# Patient Record
Sex: FEMALE | Race: WHITE | NOT HISPANIC OR LATINO
[De-identification: names, ages, dates, MRNs, and addresses within clinical notes are randomized per-mention and may not be internally consistent; named-entity substitution may affect disease eponyms.]

---

## 2019-02-20 PROBLEM — Z00.00 ENCOUNTER FOR PREVENTIVE HEALTH EXAMINATION: Status: ACTIVE | Noted: 2019-02-20

## 2019-03-08 ENCOUNTER — APPOINTMENT (OUTPATIENT)
Dept: SURGERY | Facility: CLINIC | Age: 29
End: 2019-03-08
Payer: MEDICAID

## 2019-03-08 VITALS
SYSTOLIC BLOOD PRESSURE: 120 MMHG | RESPIRATION RATE: 18 BRPM | OXYGEN SATURATION: 100 % | WEIGHT: 262 LBS | BODY MASS INDEX: 39.71 KG/M2 | TEMPERATURE: 97.8 F | HEIGHT: 68 IN | HEART RATE: 68 BPM | DIASTOLIC BLOOD PRESSURE: 78 MMHG

## 2019-03-08 DIAGNOSIS — Z78.9 OTHER SPECIFIED HEALTH STATUS: ICD-10-CM

## 2019-03-08 PROCEDURE — 99204 OFFICE O/P NEW MOD 45 MIN: CPT

## 2019-03-10 PROBLEM — Z78.9 NON-SMOKER: Status: ACTIVE | Noted: 2019-03-08

## 2019-03-10 RX ORDER — CETIRIZINE HCL 10 MG
10 TABLET ORAL
Refills: 0 | Status: ACTIVE | COMMUNITY

## 2019-03-10 NOTE — REASON FOR VISIT
[Initial Consultation] : an initial consultation for [FreeTextEntry2] : thyroid nodule [Parent] : parent

## 2019-03-10 NOTE — PHYSICAL EXAM
[de-identified] : 4 cm left thyroid nodule, well circumscribed and mobile [Laryngoscopy Performed] : laryngoscopy was performed, see procedure section for findings [Midline] : located in midline position [Normal] : orientation to person, place, and time: normal [de-identified] :  laryngoscopy shows normal vocal cord mobility bilaterally with no lesions noted

## 2019-03-10 NOTE — CONSULT LETTER
[Dear  ___] : Dear  [unfilled], [Consult Letter:] : I had the pleasure of evaluating your patient, [unfilled]. [Please see my note below.] : Please see my note below. [Consult Closing:] : Thank you very much for allowing me to participate in the care of this patient.  If you have any questions, please do not hesitate to contact me. [Sincerely,] : Sincerely, [FreeTextEntry2] : Dr. Sammi Zepeda, Dr. Shmuel Schultz [FreeTextEntry3] : Riley Lantigua MD, FACS\par System Director, Endocrine Surgery\par [facility]\par  [DrTeri  ___] : Dr. FRANCIS

## 2019-03-10 NOTE — HISTORY OF PRESENT ILLNESS
[de-identified] : thyroid nodule noted by patient for approximately 1 year. denies dysphagia, hoarseness or RT exposure. sonogram shows 4.2 cm left and 7 mm right thyroid nodules. normal TFT's.  FNA shows AUS with suspicious GEC left thyroid nodule.

## 2019-03-10 NOTE — ASSESSMENT
[FreeTextEntry1] : lengthy discussion regarding options for management including  thyroid lobectomy with paratracheal node dissection, with or without frozen section vs total thyroidectomy with paratracheal node dissection. risks, benefits and alternatives discussed at length.  wishes to proceed with lobectomy with frozen section, possible total thyroidectomy. to be scheduled at [place].  potential for hypertrophic scar discussed.

## 2019-05-16 ENCOUNTER — OTHER (OUTPATIENT)
Age: 29
End: 2019-05-16

## 2019-05-29 ENCOUNTER — APPOINTMENT (OUTPATIENT)
Dept: SURGERY | Facility: CLINIC | Age: 29
End: 2019-05-29
Payer: MEDICAID

## 2019-05-29 VITALS
HEIGHT: 68 IN | HEART RATE: 64 BPM | BODY MASS INDEX: 38.95 KG/M2 | SYSTOLIC BLOOD PRESSURE: 114 MMHG | WEIGHT: 257 LBS | DIASTOLIC BLOOD PRESSURE: 77 MMHG

## 2019-05-29 PROCEDURE — 99204 OFFICE O/P NEW MOD 45 MIN: CPT

## 2019-05-29 PROCEDURE — 99214 OFFICE O/P EST MOD 30 MIN: CPT

## 2019-05-29 RX ORDER — CETIRIZINE HCL 10 MG
10 TABLET ORAL
Refills: 0 | Status: ACTIVE | COMMUNITY

## 2019-05-29 NOTE — PLAN
[FreeTextEntry1] : Left, possible right hemithyroidectomy with intraoperative nerve monitoring\par Surgery will likely be outpatient and will be performed at [facility].\par I have also contacted Dr. Zepeda's office for final Afirma results as the available report is incomplete. \par She will obtain preoperative clearance from Dr. Shmuel Schultz\par All of her's and her mother's questions were answered to their satisfaction\par Will plan for surgery tentatively on June 27h, 2019

## 2019-05-29 NOTE — PHYSICAL EXAM
[Normal Breath Sounds] : Normal breath sounds [No Rash or Lesion] : No rash or lesion [Normal Heart Sounds] : normal heart sounds [de-identified] : NAD, well-appearing [de-identified] : left thyroid nodule, mobile; bedside ultrasound shows a well-circumscribed large left thyroid nodule without evidence of gross extrathyroidal extension. On the right, a small hypoechoic nodule confined to the gland [de-identified] : soft, NT, ND

## 2019-05-29 NOTE — ASSESSMENT
[FreeTextEntry1] : 29 yo F with an approximately 1 year history of indeterminate left thyroid nodule with mild symptoms and a small right thyroid nodule who is referred for secondary evaluation. As per notes by Dr. Lantigua, her left >4cm nodule is AUS, GEC suspicious. \par We had a long discussion about the options for management including observation, left hemithyroidectomy, total thyroidectomy and left possible total thyroidectomy. She would prefer to stay off of thyroid replacement therapy. Given the compressive symptoms, size and indeterminate nature of her left nodule, I have recommended left hemithyroidectomy with possible right if the left nodule appears malignant intraoperatively or there are clinically positive lymph nodes in the central neck. As for the right nodule, given its size and intrathyroidal location, I recommended observation, with a potential need for right hemithyroidectomy in the future should this nodule grow.  If the pathology on the left nodule comes back as aggressive, we spoke about the possible need for completion thyroidectomy as well. \par We also spoke about the risks of surgery including temporary and permanent RLN damage and hypoparathyroidism. We spoke about bleeding and infection as well. \par

## 2019-05-29 NOTE — CONSULT LETTER
[Dear  ___] : Dear  [unfilled], [( Thank you for referring [unfilled] for consultation for _____ )] : Thank you for referring [unfilled] for consultation for [unfilled] [Consult Closing:] : Thank you very much for allowing me to participate in the care of this patient.  If you have any questions, please do not hesitate to contact me. [Please see my note below.] : Please see my note below. [Sincerely,] : Sincerely, [FreeTextEntry3] : Soeldad To MD [DrTeri  ___] : Dr. FRANCIS

## 2019-05-29 NOTE — HISTORY OF PRESENT ILLNESS
[de-identified] : 29 yo F who was found to have bilateral thyroid nodules during evaluation for weight gain. TSH is 1.7, ultrasound shows a 4.2 x 2.3x3.1cm left thyroid nodule with FNA showing AUS. On the right, there is a hypoechoic 0.7x0.7x0.6cm nodule. She reports occasional dysphagia and pressure on the neck. Denies any SOB or voice changes. There is no history of radiation exposure or family history of thyroid malignancy. Laryngoscopy by Dr. Lantigua showed normal vocal cord function bilaterally. She reports a history of mild keloids but not from cuts or venipuncture.

## 2019-06-27 ENCOUNTER — APPOINTMENT (OUTPATIENT)
Dept: SURGERY | Facility: HOSPITAL | Age: 29
End: 2019-06-27
Payer: MEDICAID

## 2019-06-27 PROCEDURE — 60220 PARTIAL REMOVAL OF THYROID: CPT

## 2019-07-08 ENCOUNTER — APPOINTMENT (OUTPATIENT)
Dept: SURGERY | Facility: CLINIC | Age: 29
End: 2019-07-08
Payer: MEDICAID

## 2019-07-08 ENCOUNTER — TRANSCRIPTION ENCOUNTER (OUTPATIENT)
Age: 29
End: 2019-07-08

## 2019-07-08 VITALS
HEART RATE: 71 BPM | HEIGHT: 68 IN | BODY MASS INDEX: 39.56 KG/M2 | SYSTOLIC BLOOD PRESSURE: 122 MMHG | WEIGHT: 261 LBS | DIASTOLIC BLOOD PRESSURE: 83 MMHG

## 2019-07-08 PROCEDURE — 99024 POSTOP FOLLOW-UP VISIT: CPT

## 2019-07-09 NOTE — PLAN
[FreeTextEntry1] : will check TSH/fT4 today\par Will discuss any additional surgery after pathology returns\par If pathology favorable, will schedule ultrasound and repeat visit in 6 months.

## 2019-07-09 NOTE — CONSULT LETTER
[Consult Closing:] : Thank you very much for allowing me to participate in the care of this patient.  If you have any questions, please do not hesitate to contact me. [Sincerely,] : Sincerely, [FreeTextEntry1] : I saw Ms Gutiérrez in follow-up today. She is doing well.  Attached please find my operative report and pathology report.  [FreeTextEntry3] : Soledad To MD [DrTeri  ___] : Dr. FRANCIS

## 2019-07-09 NOTE — PHYSICAL EXAM
[de-identified] : well-healing incision with a small area of separation on the right lateral aspect, minimal surrounding erythema and no drainage; no hematoma or fluid collections under incision

## 2019-07-09 NOTE — HISTORY OF PRESENT ILLNESS
[de-identified] : Patient presents s/p left hemithyroidectomy on 6/27. Pathology is still pending. She describes some heat intolerance but otherwise feels well with no pain, normal voice and no difficulty breathing.

## 2019-07-09 NOTE — REVIEW OF SYSTEMS
[Negative] : Gastrointestinal [Sore Throat] : no sore throat [Hoarseness] : no hoarseness [Deepening Of The Voice] : no deepening of the voice [de-identified] : heat intolerance

## 2020-01-06 ENCOUNTER — APPOINTMENT (OUTPATIENT)
Dept: PLASTIC SURGERY | Facility: CLINIC | Age: 30
End: 2020-01-06
Payer: SELF-PAY

## 2020-01-06 ENCOUNTER — APPOINTMENT (OUTPATIENT)
Dept: SURGERY | Facility: CLINIC | Age: 30
End: 2020-01-06
Payer: MEDICAID

## 2020-01-06 VITALS
OXYGEN SATURATION: 97 % | SYSTOLIC BLOOD PRESSURE: 128 MMHG | TEMPERATURE: 97.3 F | RESPIRATION RATE: 20 BRPM | DIASTOLIC BLOOD PRESSURE: 78 MMHG | HEART RATE: 74 BPM

## 2020-01-06 VITALS
HEIGHT: 68 IN | SYSTOLIC BLOOD PRESSURE: 129 MMHG | BODY MASS INDEX: 40.01 KG/M2 | DIASTOLIC BLOOD PRESSURE: 83 MMHG | HEART RATE: 72 BPM | WEIGHT: 264 LBS

## 2020-01-06 DIAGNOSIS — E04.1 NONTOXIC SINGLE THYROID NODULE: ICD-10-CM

## 2020-01-06 PROCEDURE — 99213 OFFICE O/P EST LOW 20 MIN: CPT

## 2020-01-06 PROCEDURE — 99203 OFFICE O/P NEW LOW 30 MIN: CPT | Mod: NC

## 2020-01-06 NOTE — HISTORY OF PRESENT ILLNESS
[de-identified] : Patient returns for follow up. She is s/p left hemithyroidectomy for benign nodule. She has a 0.7cm nodule on the right that we have been monitoring. Patient is due for ultrasound  but forgot to obtain this prior to visit. She is doing well with good energy, normal voice and no complaints in her throat. Her TSH was tested recently and per report was normal. She has developed a hypertrophic scar in her incision and is interested in hearing about options to improve the cosmetic appearance.

## 2020-01-06 NOTE — PHYSICAL EXAM
[Respiratory Effort] : normal respiratory effort [No Rash or Lesion] : No rash or lesion [Normal Rate and Rhythm] : normal rate and rhythm [Alert] : alert [Calm] : calm [de-identified] : NAD, well-appearing [de-identified] : rey right hemithyroid; hypertrophic incision without any evidence of infection; she is speaking in a normal voice.  [de-identified] : soft, nindistended

## 2020-01-06 NOTE — CONSULT LETTER
[Dear  ___] : Dear  [unfilled], [Courtesy Letter:] : I had the pleasure of seeing your patient, [unfilled], in my office today. [Please see my note below.] : Please see my note below. [Consult Closing:] : Thank you very much for allowing me to participate in the care of this patient.  If you have any questions, please do not hesitate to contact me. [Sincerely,] : Sincerely, [FreeTextEntry3] : Soledad To MD

## 2020-01-06 NOTE — PLAN
[FreeTextEntry1] : Will refer to Dr. Chavez for management of her scar and other keloids on her body\par Order ultrasound of the neck for monitoring of right nodule\par Followup in 6 months with myself or Dr. Zepeda for continued monitoring of right nodule

## 2020-01-07 NOTE — HISTORY OF PRESENT ILLNESS
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  Pt is complaining of a thyroidectomy scar 6 months old that is becoming thick red and ropey . pt also has r and l shoulder scars that are hypertrophic years after surgery despite kenalog treatments . The risks, benefits, alternatives, limitations and the permanent scars were outlined with the patient.  pt is a candidate for 1540 laser treatments and Kenalog injections  .  will schedule a series of 5 sessions of laser intermingled with Kenalog injections  limitations discussed \par

## 2020-01-07 NOTE — PHYSICAL EXAM
[NI] : Normal [de-identified] : healed lower neck horizontal scar with redness and hypertrophy  raised 3 mm and 3 mm wide  9 cm long  [de-identified] : healed scars on shoulder with scar hypertrophy

## 2020-01-07 NOTE — ASSESSMENT
[FreeTextEntry1] : pt has hypertrophic scarring that will likely but not definitely benefit from 1540 xd laser treatments and Kenalog injections. The risks, benefits, alternatives, limitations and the permanent scars were outlined with the patient.\par

## 2020-01-21 ENCOUNTER — APPOINTMENT (OUTPATIENT)
Dept: PLASTIC SURGERY | Facility: CLINIC | Age: 30
End: 2020-01-21
Payer: MEDICAID

## 2020-01-21 PROCEDURE — 17999 UNLISTD PX SKN MUC MEMB SUBQ: CPT

## 2020-02-11 ENCOUNTER — APPOINTMENT (OUTPATIENT)
Dept: PLASTIC SURGERY | Facility: CLINIC | Age: 30
End: 2020-02-11
Payer: MEDICAID

## 2020-02-11 PROCEDURE — 17999 UNLISTD PX SKN MUC MEMB SUBQ: CPT | Mod: 78

## 2020-02-11 NOTE — PROCEDURE
[FreeTextEntry6] : After numbing skin surface with lidocaine cream the 1540 XD laser was selected and treatment with 5 passes completed on hypertrophic neck and shoulder  scars.  pt tolerated the procedure well there were no complications and erythema was mild with no blistering. this was her 2nd of 5 sessions and she noted improvement after the first session and kenalog \par

## 2020-03-10 ENCOUNTER — APPOINTMENT (OUTPATIENT)
Dept: PLASTIC SURGERY | Facility: CLINIC | Age: 30
End: 2020-03-10
Payer: SELF-PAY

## 2020-03-10 PROCEDURE — 17999 UNLISTD PX SKN MUC MEMB SUBQ: CPT | Mod: 78

## 2020-03-12 NOTE — PROCEDURE
[FreeTextEntry1] : hypertrophic scars neck and r shoulder  [FreeTextEntry2] : 1540 laser treatment [FreeTextEntry6] : After numbing skin surface with lidocaine cream the 1540 XD laser was selected and treatment with 5 passes completed on hypertrophic neck and shoulder scars.  pt tolerated the procedure well there were not complications and erythema was mild with no blistering\par \par

## 2020-05-19 ENCOUNTER — APPOINTMENT (OUTPATIENT)
Dept: PLASTIC SURGERY | Facility: CLINIC | Age: 30
End: 2020-05-19

## 2020-06-16 ENCOUNTER — APPOINTMENT (OUTPATIENT)
Dept: PLASTIC SURGERY | Facility: CLINIC | Age: 30
End: 2020-06-16
Payer: SELF-PAY

## 2020-06-16 PROCEDURE — 17999 UNLISTD PX SKN MUC MEMB SUBQ: CPT

## 2020-06-17 NOTE — PROCEDURE
[FreeTextEntry1] : hypertrophic scar  [FreeTextEntry6] : After numbing skin surface with lidocaine cream the 1540 XD laser was selected and treatment with 6 passes completed on hypertrophic neck and shoulder scars.  pt tolerated the procedure well there were not complications and erythema was mild with no blistering\par \par  [FreeTextEntry2] : 1544 laser

## 2020-08-25 ENCOUNTER — APPOINTMENT (OUTPATIENT)
Dept: PLASTIC SURGERY | Facility: CLINIC | Age: 30
End: 2020-08-25
Payer: COMMERCIAL

## 2020-08-25 ENCOUNTER — APPOINTMENT (OUTPATIENT)
Dept: PLASTIC SURGERY | Facility: CLINIC | Age: 30
End: 2020-08-25
Payer: SELF-PAY

## 2020-08-25 PROCEDURE — 99499Q: CUSTOM | Mod: NC

## 2020-08-25 PROCEDURE — 17999 UNLISTD PX SKN MUC MEMB SUBQ: CPT

## 2020-09-08 NOTE — ASSESSMENT
[FreeTextEntry1] : BETITO tolerated the procedure well and notes improved appearance of all scars .\par BETITO will return to the office for a post procedure visit as needed \par

## 2020-09-08 NOTE — PROCEDURE
[FreeTextEntry1] : hypertrophic scars  [FreeTextEntry2] : 1540 xd  laser  [FreeTextEntry6] : After numbing skin surface with lidocaine cream the 1540 XD laser was selected and treatment with 5 passes completed on hypertrophic neck and shoulder scars.  pt tolerated the procedure well there were not complications and erythema was mild with no blistering\par

## 2020-09-15 ENCOUNTER — APPOINTMENT (OUTPATIENT)
Dept: PLASTIC SURGERY | Facility: CLINIC | Age: 30
End: 2020-09-15
Payer: SELF-PAY

## 2020-09-15 VITALS
TEMPERATURE: 98.4 F | RESPIRATION RATE: 18 BRPM | DIASTOLIC BLOOD PRESSURE: 80 MMHG | HEART RATE: 77 BPM | SYSTOLIC BLOOD PRESSURE: 117 MMHG | OXYGEN SATURATION: 99 %

## 2020-09-15 PROCEDURE — 17999 UNLISTD PX SKN MUC MEMB SUBQ: CPT

## 2020-09-15 NOTE — PROCEDURE
[FreeTextEntry6] : kenalog injection aseptic conditions with 30 g needle to neck and arm /shoulder scars .75 cc total volume injected BETITO tolerated the procedure well.\par After numbing skin surface with lidocaine cream the 1540 XD laser was selected and treatment with 6 passes completed on hypertrophic neck arm and shoulder scars.  pt tolerated the procedure well there were not complications and erythema was mild with no blistering\par

## 2020-09-15 NOTE — ASSESSMENT
[FreeTextEntry1] : BETITO will return to the office for a post procedure visit in 3 weeks for next laser session

## 2020-10-06 ENCOUNTER — APPOINTMENT (OUTPATIENT)
Dept: PLASTIC SURGERY | Facility: CLINIC | Age: 30
End: 2020-10-06
Payer: SELF-PAY

## 2020-10-06 PROCEDURE — 17999 UNLISTD PX SKN MUC MEMB SUBQ: CPT

## 2020-10-07 NOTE — ASSESSMENT
[FreeTextEntry1] : BETITO tolerated the procedure well.\par BETITO will return to the office for a post procedure visit in 3 weeks The instructions were reviewed in detail with BETITO.\par \par

## 2021-01-19 ENCOUNTER — APPOINTMENT (OUTPATIENT)
Dept: PLASTIC SURGERY | Facility: CLINIC | Age: 31
End: 2021-01-19
Payer: SELF-PAY

## 2021-01-19 PROCEDURE — 17999 UNLISTD PX SKN MUC MEMB SUBQ: CPT

## 2021-02-16 ENCOUNTER — APPOINTMENT (OUTPATIENT)
Dept: PLASTIC SURGERY | Facility: CLINIC | Age: 31
End: 2021-02-16
Payer: SELF-PAY

## 2021-02-16 PROCEDURE — 17999 UNLISTD PX SKN MUC MEMB SUBQ: CPT

## 2021-03-15 NOTE — PROCEDURE
[FreeTextEntry1] : scar neck and shouolder  [FreeTextEntry2] : 1543 laser  [FreeTextEntry6] : After numbing skin surface with lidocaine cream the 1540 XD laser was selected and treatment with 6 passes completed on hypertrophic neck and shoulder  scars.  pt tolerated the procedure well there were not complications and erythema was mild with no blistering\par

## 2021-03-15 NOTE — ASSESSMENT
[FreeTextEntry1] : BETITO tolerated the procedure well.\aleyda BETITO will return to the office for a post procedure visit 3 weeks

## 2021-03-23 ENCOUNTER — APPOINTMENT (OUTPATIENT)
Dept: PLASTIC SURGERY | Facility: CLINIC | Age: 31
End: 2021-03-23
Payer: SELF-PAY

## 2021-03-23 DIAGNOSIS — L91.0 HYPERTROPHIC SCAR: ICD-10-CM

## 2021-03-23 PROCEDURE — 17999 UNLISTD PX SKN MUC MEMB SUBQ: CPT

## 2021-04-28 NOTE — ASSESSMENT
[FreeTextEntry1] : BETITO tolerated the procedure well. The instructions were reviewed in detail with BETITO. BETITO will return to the office for her next procedure visit in 3 weeks  gdma 2 on insulin

## 2021-04-28 NOTE — PROCEDURE
[FreeTextEntry1] : scars neck /shoulder  [FreeTextEntry6] : After numbing skin surface with lidocaine cream the 1540 XD laser was selected and treatment with 6 passes completed on hypertrophic neck and shoulder .  pt tolerated the procedure well there were not complications and erythema was mild with no blistering\par

## 2021-05-04 PROBLEM — L91.0 SCAR, HYPERTROPHIC: Status: ACTIVE | Noted: 2020-01-07

## 2021-05-04 NOTE — PROCEDURE
[FreeTextEntry6] : After numbing skin surface with lidocaine cream the 1540 XD laser was selected and treatment with 4 passes completed on hypertrophic breast and abdominal scars.  pt tolerated the procedure well there were not complications and erythema was mild with no blistering\par

## 2021-05-04 NOTE — ASSESSMENT
[FreeTextEntry1] : BETITO tolerated the procedure well. The instructions were reviewed in detail with BETITO. BETITO will return to the office for next procedure visit 3 weeks

## 2023-02-20 ENCOUNTER — TRANSCRIPTION ENCOUNTER (OUTPATIENT)
Age: 33
End: 2023-02-20